# Patient Record
Sex: FEMALE | Race: OTHER | NOT HISPANIC OR LATINO | URBAN - METROPOLITAN AREA
[De-identification: names, ages, dates, MRNs, and addresses within clinical notes are randomized per-mention and may not be internally consistent; named-entity substitution may affect disease eponyms.]

---

## 2021-08-12 ENCOUNTER — OFFICE VISIT (OUTPATIENT)
Dept: OBGYN CLINIC | Facility: CLINIC | Age: 56
End: 2021-08-12
Payer: COMMERCIAL

## 2021-08-12 VITALS
DIASTOLIC BLOOD PRESSURE: 79 MMHG | WEIGHT: 154 LBS | BODY MASS INDEX: 33.22 KG/M2 | HEIGHT: 57 IN | HEART RATE: 67 BPM | SYSTOLIC BLOOD PRESSURE: 115 MMHG

## 2021-08-12 DIAGNOSIS — M22.2X1 PATELLOFEMORAL SYNDROME OF BOTH KNEES: ICD-10-CM

## 2021-08-12 DIAGNOSIS — M22.2X2 PATELLOFEMORAL SYNDROME OF LEFT KNEE: ICD-10-CM

## 2021-08-12 DIAGNOSIS — M22.2X2 PATELLOFEMORAL SYNDROME OF BOTH KNEES: ICD-10-CM

## 2021-08-12 DIAGNOSIS — M17.12 PRIMARY OSTEOARTHRITIS OF LEFT KNEE: Primary | ICD-10-CM

## 2021-08-12 DIAGNOSIS — M62.9 HAMSTRING TIGHTNESS OF BOTH LOWER EXTREMITIES: ICD-10-CM

## 2021-08-12 DIAGNOSIS — R29.898 WEAKNESS OF BOTH HIPS: ICD-10-CM

## 2021-08-12 PROCEDURE — 99204 OFFICE O/P NEW MOD 45 MIN: CPT | Performed by: FAMILY MEDICINE

## 2021-08-12 RX ORDER — MELOXICAM 15 MG/1
15 TABLET ORAL DAILY
Qty: 30 TABLET | Refills: 1 | Status: SHIPPED | OUTPATIENT
Start: 2021-08-12

## 2021-08-23 ENCOUNTER — EVALUATION (OUTPATIENT)
Dept: PHYSICAL THERAPY | Facility: CLINIC | Age: 56
End: 2021-08-23
Payer: COMMERCIAL

## 2021-08-23 DIAGNOSIS — M62.9 HAMSTRING TIGHTNESS OF BOTH LOWER EXTREMITIES: ICD-10-CM

## 2021-08-23 DIAGNOSIS — M22.2X1 PATELLOFEMORAL SYNDROME OF BOTH KNEES: ICD-10-CM

## 2021-08-23 DIAGNOSIS — R29.898 WEAKNESS OF BOTH HIPS: ICD-10-CM

## 2021-08-23 DIAGNOSIS — M22.2X2 PATELLOFEMORAL SYNDROME OF BOTH KNEES: ICD-10-CM

## 2021-08-23 DIAGNOSIS — M17.12 PRIMARY OSTEOARTHRITIS OF LEFT KNEE: ICD-10-CM

## 2021-08-23 PROCEDURE — 97161 PT EVAL LOW COMPLEX 20 MIN: CPT | Performed by: PHYSICAL THERAPIST

## 2021-08-23 PROCEDURE — 97110 THERAPEUTIC EXERCISES: CPT | Performed by: PHYSICAL THERAPIST

## 2021-08-23 NOTE — PROGRESS NOTES
PT Evaluation     Today's date: 2021  Patient name: Layla Lazcano  : 1965  MRN: [id number]  Referring provider: Debbie Langford DO  Dx:   Encounter Diagnosis     ICD-10-CM    1  Primary osteoarthritis of left knee  M17 12 Ambulatory referral to Physical Therapy   2  Patellofemoral syndrome of both knees  M22 2X1 Ambulatory referral to Physical Therapy    M22 2X2    3  Weakness of both hips  R29 898 Ambulatory referral to Physical Therapy   4  Hamstring tightness of both lower extremities  M62 9 Ambulatory referral to Physical Therapy                  Assessment  Assessment details: Layla Lazcano is a 60 y o  female presenting as an outpatient to [place] [id number] PT w/ c/o L knee pain  In addition to pain, pt presents w/ edema, medial patellar tilt, hypertonicity of surrounding musculature, decreased ROM, and decreased strength significantly limiting pt's functional ability  Pt will benefit from skilled PT services to address the above deficits in order to max function to allow pt to achieve goals in PT  Thank you for the referral of this pt  Impairments: abnormal muscle tone, abnormal or restricted ROM, activity intolerance, impaired physical strength, lacks appropriate home exercise program and pain with function  Understanding of Dx/Px/POC: good   Prognosis: good    Goals  ST  Pain decreased by 25% in 4-6 weeks  2  ROM increased by 25% in 4-6 weeks  3  Strength increased by 1/2 to 1 muscle grade in all deficient muscle groups in 4-6 weeks  LT  Decrease pain to 1-2/10 at worst by d/c   2  Increase ROM to [facility] for all deficient movements by d/c   3  Strength increased to 5 for all deficient muscle groups by d/c   4  IADL performance increased to max function by d/c   5  Recreational performance increased to max function by d/c   6  Ambulation/stair climbing improved to max level of function by d/c      Plan  Planned modality interventions: cryotherapy and TENS  Other planned modality interventions: other modalities PRN  Planned therapy interventions: ADL retraining, IADL retraining, flexibility, functional ROM exercises, graded exercise, home exercise program, manual therapy, joint mobilization, neuromuscular re-education, patient education, postural training, strengthening, therapeutic exercise, therapeutic activities and gait training  Other planned therapy interventions: other interventions PRN  Frequency: 1-2x/week  Duration in weeks: 6  Plan of Care beginning date: 2021  Plan of Care expiration date: 10/4/2021  Treatment plan discussed with: patient        Subjective Evaluation    History of Present Illness  Mechanism of injury: Pt reports to IE w/ c/o L knee pain which began in March/2021  She believes onset of pain related to walking in R CAM boot for several months due to R foot fracture  Pain was progressively worsening until she saw ortho who prescribed vitamins/brace which seem to be helping w/ pain   Pt no longer using AD and wearing sneaker regarding R foot fracture  She continues to report intermittent R foot pain  Pt denies any trauma/injury which may have caused initial onset of symptoms  Pt denies any numbness/parasthesias  Pain  Current pain ratin  At worst pain ratin  Location: L knee  Relieving factors: medications and ice (brace; Meloxicam)  Exacerbated by: stair climbing (compensates w/ STS fashion), walking/standing (sometimes immediately and sometimes after few minutes), functional squat, sleeping at night, driving/sitting for extended periods of time w/ knee flexed      Social Support  Steps to enter house: yes (4 MARGARET w/ rail (STS fashion))  Stairs in house: yes (1 FF steps w/ rail to basement (STS fashion))   Lives with: spouse    Employment status: not working (has not returned to work since R foot injury)  Patient Goals  Patient goals for therapy: decreased pain, increased motion and increased strength          Objective Active Range of Motion   Left Knee   Flexion: 124 degrees with pain  Extension: 8 degrees with pain    Passive Range of Motion   Left Knee   Flexion: 130 degrees with pain    Mobility   Patellar Mobility:   Left Knee   WFL: medial, lateral, superior and inferior  Additional Mobility Details  Static patellar positioning: Medial patellar tilt    Strength/Myotome Testing     Left Knee   Flexion: 4+ (*pain)  Extension: 4+  Quadriceps contraction: fair    Additional Strength Details  Hip Strength (L):   Flexion (assessed in supine via SLR):  4-*pain    Abduction: (assessed in s/l): 4*pain  ER (assessed in s/l): 4+ *pain  IR (assessed in s/l): 4 *Pain  Extension (assessed in prone): 4-     Tests     Additional Tests Details  Observation: Pt w/ min edema medial to patella in L vs R knee w/ circumferential girth measurements as below (L/R):  Suprapatellar: 44 cm/45 cm  Midpatellar: 42 cm/42 cm  Infrapatellar:  39 cm/38 cm    Palpation: Hypertonicity/tenderness w/ palpation to medial hamstring, prox gastroc, distal adductors/quad ; tenderness w/ palpation to medial worse vs lateral to knee joint    Knee Special Tests (L):  Lachman: negative  Reverse Lachman: negative  Varus/valgus Stress: negative  Chris: negative  Patellar Tracking: negative     Hamstring Length (assessed via popliteal angle method- L): 22 deg *Pain  PKB (L): 106 deg *Pain        Med[employer] Access Code: [email or web address]        Daily Treatment Diary    EPOC: 10/4/2021  Precautions: Hx of R foot fracture w/in the past year  Co- Morbidities: Hypothyroidism;  Intermittent vertigo      Manual  8/23                     TPR to distal adductors/quad/med HS/prox gastroc (consider IASTM)  NV                                                                                             Total Time                         Exercise Diary 8/23                     THEREX             Pt ed  8' HEP instruct/handout            L knee flexion/patellar PROM             Recumbent Bike                       Gastrocs Stretch- standing                       HR/TR                       Active HS stretch w/ ankle pumps             Prone quad stretch             ITB stretch             Heel Slides                                       NEURO RE-ED             SLR flex/abd/ext                       clamshells             Reverse clamshells             TB resisted 3 way hip strengthening             TB resisted sidestepping             STS                        Lat step ups                       Step ups/downs               Gait Training                                                                       Modalities 8/23                     CP PRN declined

## 2021-08-25 ENCOUNTER — OFFICE VISIT (OUTPATIENT)
Dept: PHYSICAL THERAPY | Facility: CLINIC | Age: 56
End: 2021-08-25
Payer: COMMERCIAL

## 2021-08-25 DIAGNOSIS — R29.898 WEAKNESS OF BOTH HIPS: ICD-10-CM

## 2021-08-25 DIAGNOSIS — M17.12 PRIMARY OSTEOARTHRITIS OF LEFT KNEE: Primary | ICD-10-CM

## 2021-08-25 DIAGNOSIS — M22.2X2 PATELLOFEMORAL SYNDROME OF BOTH KNEES: ICD-10-CM

## 2021-08-25 DIAGNOSIS — M22.2X1 PATELLOFEMORAL SYNDROME OF BOTH KNEES: ICD-10-CM

## 2021-08-25 PROCEDURE — 97112 NEUROMUSCULAR REEDUCATION: CPT

## 2021-08-25 PROCEDURE — 97110 THERAPEUTIC EXERCISES: CPT

## 2021-08-25 NOTE — PROGRESS NOTES
Daily Note     Today's date: 2021  Patient name: Jenna Cates  : 1965  MRN: [id number]  Referring provider: Lionel Messer DO  Dx:   Encounter Diagnosis     ICD-10-CM    1  Primary osteoarthritis of left knee  M17 12    2  Patellofemoral syndrome of both knees  M22 2X1     M22 2X2    3  Weakness of both hips  R29 898        Start Time: 1205          Subjective: Patient reports her knee is starting to feel better  Objective: See treatment diary below      Assessment: Tolerated treatment well  Patient demonstrated fatigue post treatment and would benefit from continued skilled PT  Patient required extensive cueing for proper technique w/ s/l SLR abduction  Patient had a tendency to flex hip due to week glute and hip muscles  Reduced tightness in distal Rectus Femoris post MFR  Plan: Continue per plan of care  Daily Treatment Diary    EPOC: 10/4/2021  Precautions: Hx of R foot fracture w/in the past year  Co- Morbidities: Hypothyroidism;  Intermittent vertigo      Manual                     TPR to distal adductors/quad/med HS/prox gastroc (consider IASTM)  [facility] HECTOR                                                                                           Total Time                         Exercise Diary                    THEREX             Pt ed  8' HEP instruct/handout            L knee flexion/patellar PROM             Recumbent Bike    5'                   Gastrocs Stretch- standing                       HR/TR                       Active HS stretch w/ ankle pumps  10 pumps 3x           Prone quad stretch             ITB stretch             Heel Slides                                       NEURO RE-ED             SLR flex/abd/ext    2x10/15x constant cueing                   clamshells  20x           bridges  20x                                     Reverse clamshells  15x           TB resisted 3 way hip strengthening             TB resisted sidestepping STS                        Lat step ups                       Step ups/downs               Gait Training                                                                       Modalities 8/23                     CP PRN declined

## 2021-08-30 ENCOUNTER — OFFICE VISIT (OUTPATIENT)
Dept: PHYSICAL THERAPY | Facility: CLINIC | Age: 56
End: 2021-08-30
Payer: COMMERCIAL

## 2021-08-30 DIAGNOSIS — M22.2X1 PATELLOFEMORAL SYNDROME OF BOTH KNEES: ICD-10-CM

## 2021-08-30 DIAGNOSIS — R29.898 WEAKNESS OF BOTH HIPS: ICD-10-CM

## 2021-08-30 DIAGNOSIS — M62.9 HAMSTRING TIGHTNESS OF BOTH LOWER EXTREMITIES: ICD-10-CM

## 2021-08-30 DIAGNOSIS — M22.2X2 PATELLOFEMORAL SYNDROME OF BOTH KNEES: ICD-10-CM

## 2021-08-30 DIAGNOSIS — M17.12 PRIMARY OSTEOARTHRITIS OF LEFT KNEE: Primary | ICD-10-CM

## 2021-08-30 PROCEDURE — 97110 THERAPEUTIC EXERCISES: CPT

## 2021-08-30 PROCEDURE — 97112 NEUROMUSCULAR REEDUCATION: CPT

## 2021-08-30 NOTE — PROGRESS NOTES
Daily Note     Today's date: 2021  Patient name: Sola Faye  : 1965  MRN: [id number]  Referring provider: Amanda Sepulveda DO  Dx:   Encounter Diagnosis     ICD-10-CM    1  Primary osteoarthritis of left knee  M17 12    2  Patellofemoral syndrome of both knees  M22 2X1     M22 2X2    3  Weakness of both hips  R29 898    4  Hamstring tightness of both lower extremities  M62 9        Start Time: 1225  Stop Time: 1310  Total time in clinic (min): 45 minutes    Subjective: Patient reports she was a little sore post last session but pain went away within a few hours  Patient stated her knee is feeling better today  Objective: See treatment diary below      Assessment: Tolerated treatment well  Patient demonstrated fatigue post treatment and would benefit from continued skilled PT  This session we progressed to standing hip strengthening w/ fatigue reported in glutes  Reduced muscular tightness in VMO noted  Plan: Continue per plan of care  Daily Treatment Diary    EPOC: 10/4/2021  Precautions: Hx of R foot fracture w/in the past year  Co- Morbidities: Hypothyroidism;  Intermittent vertigo      Manual                   TPR to distal adductors/quad/med HS/prox gastroc (consider IASTM)  NV  [facility] HECTOR  [facility] HECTOR                                                                                         Total Time      5'                   Exercise Diary                  THEREX             Pt ed  8' HEP instruct/handout            L knee flexion/patellar PROM             Recumbent Bike    5'  10'                 Gastrocs Stretch- standing                       HR/TR                       Active HS stretch w/ ankle pumps  10 pumps 3x 10 pumps 3x          Prone quad stretch             ITB stretch             Heel Slides                                       NEURO RE-ED             SLR flex/abd/ext    2x10/15x constant cueing  2x10                 clamshells  20x 20x bridges  20x 20x                                    Reverse clamshells  15x 20x          TB resisted 3 way hip strengthening   2x10          TB resisted sidestepping             STS                        Lat step ups                       Step ups/downs    6" 20x           Gait Training                                                                       Modalities 8/23                     CP PRN declined

## 2021-09-01 ENCOUNTER — OFFICE VISIT (OUTPATIENT)
Dept: PHYSICAL THERAPY | Facility: CLINIC | Age: 56
End: 2021-09-01
Payer: COMMERCIAL

## 2021-09-01 DIAGNOSIS — M22.2X1 PATELLOFEMORAL SYNDROME OF BOTH KNEES: ICD-10-CM

## 2021-09-01 DIAGNOSIS — M22.2X2 PATELLOFEMORAL SYNDROME OF BOTH KNEES: ICD-10-CM

## 2021-09-01 DIAGNOSIS — M17.12 PRIMARY OSTEOARTHRITIS OF LEFT KNEE: Primary | ICD-10-CM

## 2021-09-01 PROCEDURE — 97112 NEUROMUSCULAR REEDUCATION: CPT

## 2021-09-01 PROCEDURE — 97110 THERAPEUTIC EXERCISES: CPT

## 2021-09-01 NOTE — PROGRESS NOTES
Daily Note     Today's date: 2021  Patient name: Curtis Alexander  : 1965  MRN: [id number]  Referring provider: Justine Nuñez DO  Dx:   Encounter Diagnosis     ICD-10-CM    1  Primary osteoarthritis of left knee  M17 12    2  Patellofemoral syndrome of both knees  M22 2X1     M22 2X2        Start Time: 0603  Stop Time: 1625  Total time in clinic (min): 44 minutes    Subjective: Patient reports her knee is doing better, able to do more with less pain  Objective: See treatment diary below      Assessment: Tolerated treatment well  Patient demonstrated fatigue post treatment and would benefit from continued skilled PT  This session we progressed functional strengthening w/o difficulty  Patient did c/o tightness in medial knee, reduced post MFR  Plan: Continue per plan of care  Daily Treatment Diary    EPOC: 10/4/2021  Precautions: Hx of R foot fracture w/in the past year  Co- Morbidities: Hypothyroidism;  Intermittent vertigo      Manual                 TPR to distal adductors/quad/med HS/prox gastroc (consider IASTM)  [place]  [facility]                                                                                       Total Time      5'  5'                 Exercise Diary                THEREX             Pt ed  8' HEP instruct/handout            L knee flexion/patellar PROM             Recumbent Bike    5'  10'  10'               Gastrocs Stretch- standing       Off step 30" 4x                HR/TR                       Active HS stretch w/ ankle pumps  10 pumps 3x 10 pumps 3x          Prone quad stretch             ITB stretch             Heel Slides                                       NEURO RE-ED             SLR flex/abd/ext    2x10/15x constant cueing  2x10                 clamshells  20x 20x          bridges  20x 20x                                    Reverse clamshells  15x 20x          TB resisted 3 way hip strengthening   2x10 2x10 TB resisted sidestepping    YTB 3 laps at bar         STS        2x10                Lat step ups        6" 20x               Step ups/downs    6" 20x 6" 20x          Gait Training                                                                       Modalities 8/23                     CP PRN declined

## 2021-09-13 ENCOUNTER — OFFICE VISIT (OUTPATIENT)
Dept: PHYSICAL THERAPY | Facility: CLINIC | Age: 56
End: 2021-09-13
Payer: COMMERCIAL

## 2021-09-13 DIAGNOSIS — M22.2X2 PATELLOFEMORAL SYNDROME OF BOTH KNEES: ICD-10-CM

## 2021-09-13 DIAGNOSIS — M17.12 PRIMARY OSTEOARTHRITIS OF LEFT KNEE: Primary | ICD-10-CM

## 2021-09-13 DIAGNOSIS — M22.2X1 PATELLOFEMORAL SYNDROME OF BOTH KNEES: ICD-10-CM

## 2021-09-13 PROCEDURE — 97112 NEUROMUSCULAR REEDUCATION: CPT

## 2021-09-13 PROCEDURE — 97110 THERAPEUTIC EXERCISES: CPT

## 2021-09-13 NOTE — PROGRESS NOTES
Daily Note     Today's date: 2021  Patient name: Christin Rader  : 1965  MRN: [id number]  Referring provider: Niraj Lopez DO  Dx:   Encounter Diagnosis     ICD-10-CM    1  Primary osteoarthritis of left knee  M17 12    2  Patellofemoral syndrome of both knees  M22 2X1     M22 2X2        Start Time: 9955  Stop Time: 1540  Total time in clinic (min): 45 minutes    Subjective: Patient reports she thinks her knee is getting better  Objective: See treatment diary below      Assessment: Tolerated treatment well  Patient demonstrated fatigue post treatment and would benefit from continued skilled PT  This session we progressed stability exercises  Provided patient w/ HEP  Plan: Continue per plan of care  Daily Treatment Diary    EPOC: 10/4/2021  Precautions: Hx of R foot fracture w/in the past year  Co- Morbidities: Hypothyroidism;  Intermittent vertigo      Manual                 TPR to distal adductors/quad/med HS/prox gastroc (consider IASTM)  [place]  [facility]                                                                                       Total Time      5'  5'                 Exercise Diary              THEREX             Pt ed  8' HEP instruct/handout            L knee flexion/patellar PROM             Recumbent Bike    5'  10'  10'  10'             Gastrocs Stretch- standing       Off step 30" 4x   Off step 30" 4x             HR/TR                       Active HS stretch w/ ankle pumps  10 pumps 3x 10 pumps 3x          Prone quad stretch             ITB stretch             Heel Slides                                       NEURO RE-ED             SLR flex/abd/ext    2x10/15x constant cueing  2x10                 clamshells  20x 20x          bridges  20x 20x          SLS airex     30" 3x ea                     Reverse clamshells  15x 20x          TB resisted 3 way hip strengthening   2x10 2x10 2x10 YTB        TB resisted sidestepping    YTB 3 laps at bar YTB 3 laps at bar + mud walks 3 laps        STS        2x10 2x10              Lat step ups        6" 20x  6" 20x             Step ups/downs    6" 20x 6" 20x 6" 20x         Gait Training                                                                       Modalities 8/23                     CP PRN declined

## 2021-09-15 ENCOUNTER — OFFICE VISIT (OUTPATIENT)
Dept: PHYSICAL THERAPY | Facility: CLINIC | Age: 56
End: 2021-09-15
Payer: COMMERCIAL

## 2021-09-15 DIAGNOSIS — M17.12 PRIMARY OSTEOARTHRITIS OF LEFT KNEE: Primary | ICD-10-CM

## 2021-09-15 DIAGNOSIS — M22.2X2 PATELLOFEMORAL SYNDROME OF BOTH KNEES: ICD-10-CM

## 2021-09-15 DIAGNOSIS — M22.2X1 PATELLOFEMORAL SYNDROME OF BOTH KNEES: ICD-10-CM

## 2021-09-15 PROCEDURE — 97530 THERAPEUTIC ACTIVITIES: CPT

## 2021-09-15 PROCEDURE — 97112 NEUROMUSCULAR REEDUCATION: CPT

## 2021-09-15 NOTE — PROGRESS NOTES
Daily Note     Today's date: 9/15/2021  Patient name: Brayan Cochran  : 1965  MRN: [id number]  Referring provider: Aubrie Salazar DO  Dx:   Encounter Diagnosis     ICD-10-CM    1  Primary osteoarthritis of left knee  M17 12    2  Patellofemoral syndrome of both knees  M22 2X1     M22 2X2        Start Time: 1218  Stop Time: 1310  Total time in clinic (min): 52 minutes    Subjective: Patient reports she has difficulty descending stairs compared to ascending stairs  Objective: See treatment diary below      Assessment: Tolerated treatment well  Patient demonstrated fatigue post treatment and would benefit from continued skilled PT  We added fwd/lat step downs this session  Patient c/o knee pain w/ step downs, tolerated better w/ 2" step  Plan: Continue per plan of care  Daily Treatment Diary    EPOC: 10/4/2021  Precautions: Hx of R foot fracture w/in the past year  Co- Morbidities: Hypothyroidism;  Intermittent vertigo      Manual                 TPR to distal adductors/quad/med HS/prox gastroc (consider IASTM)  [place]  [facility]                                                                                       Total Time      5'  5'                 Exercise Diary 8/23  8/25  8/30  9/1  9/13  9/15           THEREX             Pt ed  8' HEP instruct/handout            L knee flexion/patellar PROM             Recumbent Bike    5'  10'  10'  10'  10'           Gastrocs Stretch- standing       Off step 30" 4x   Off step 30" 4x  Off step 30" 4x           bosu lunges           2x10           Active HS stretch w/ ankle pumps  10 pumps 3x 10 pumps 3x          Prone quad stretch             ITB stretch             Heel Slides                                       NEURO RE-ED             SLR flex/abd/ext    2x10/15x constant cueing  2x10                 clamshells  20x 20x          bridges  20x 20x          SLS airex     30" 3x ea 30" 3x ea                    Reverse clamshells 15x 20x          TB resisted 3 way hip strengthening   2x10 2x10 2x10 YTB YTB 2x10 ea       TB resisted sidestepping    YTB 3 laps at bar YTB 3 laps at bar + mud walks 3 laps YTB 3 laps + mud walks 3 laps at bar       STS        2x10 2x10  2x10            Lat step ups        6" 20x  6" 20x  6" 20x           Step ups/downs    6" 20x 6" 20x 6" 20x 6" 20x        Therapeutic activity                       Lateral dips           4" 2x10           Step downs           2" 2x10             Modalities 8/23                     CP PRN declined

## 2021-09-20 ENCOUNTER — OFFICE VISIT (OUTPATIENT)
Dept: PHYSICAL THERAPY | Facility: CLINIC | Age: 56
End: 2021-09-20
Payer: COMMERCIAL

## 2021-09-20 DIAGNOSIS — M17.12 PRIMARY OSTEOARTHRITIS OF LEFT KNEE: Primary | ICD-10-CM

## 2021-09-20 DIAGNOSIS — M22.2X2 PATELLOFEMORAL SYNDROME OF BOTH KNEES: ICD-10-CM

## 2021-09-20 DIAGNOSIS — M22.2X1 PATELLOFEMORAL SYNDROME OF BOTH KNEES: ICD-10-CM

## 2021-09-20 PROCEDURE — 97110 THERAPEUTIC EXERCISES: CPT

## 2021-09-20 PROCEDURE — 97112 NEUROMUSCULAR REEDUCATION: CPT

## 2021-09-20 NOTE — PROGRESS NOTES
Daily Note     Today's date: 2021  Patient name: Yoana Marte  : 1965  MRN: [id number]  Referring provider: Joan Ocasio DO  Dx:   Encounter Diagnosis     ICD-10-CM    1  Primary osteoarthritis of left knee  M17 12    2  Patellofemoral syndrome of both knees  M22 2X1     M22 2X2        Start Time: 0087  Stop Time: 1315  Total time in clinic (min): 47 minutes    Subjective: Patient reports she was sore this weekend secondary to trying to jump  Objective: See treatment diary below      Assessment: Tolerated treatment well  Patient demonstrated fatigue post treatment and would benefit from continued skilled PT  Patient was challenged w/ increased height w/ fwd step ups  Patient c/o fatigue and discomfort in R knee w/ step ups  Plan: Continue per plan of care  Daily Treatment Diary    EPOC: 10/4/2021  Precautions: Hx of R foot fracture w/in the past year  Co- Morbidities: Hypothyroidism;  Intermittent vertigo      Manual                 TPR to distal adductors/quad/med HS/prox gastroc (consider IASTM)  [place]  [facility]                                                                                       Total Time      5'  5'                 Exercise Diary 8/23  8/25  8/30  9/1  9/13  9/15  9/20         THEREX             Pt ed  8' HEP instruct/handout            L knee flexion/patellar PROM             Recumbent Bike    5'  10'  10'  10'  10'  10  '         Gastrocs Stretch- standing       Off step 30" 4x   Off step 30" 4x  Off step 30" 4x  30" 4x         bosu lunges           2x10           Active HS stretch w/ ankle pumps  10 pumps 3x 10 pumps 3x          Prone quad stretch             ITB stretch             Heel Slides                                       NEURO RE-ED             SLR flex/abd/ext    2x10/15x constant cueing  2x10                 clamshells  20x 20x          bridges  20x 20x          SLS airex     30" 3x ea 30" 3x ea Reverse clamshells  15x 20x          TB resisted 3 way hip strengthening   2x10 2x10 2x10 YTB YTB 2x10 ea YTB 20x      TB resisted sidestepping    YTB 3 laps at bar YTB 3 laps at bar + mud walks 3 laps YTB 3 laps + mud walks 3 laps at bar 3 laps      STS        2x10 2x10  2x10            Lat step ups        6" 20x  6" 20x  6" 20x  6" 20x         Step ups/downs    6" 20x 6" 20x 6" 20x 6" 20x 8" 20x       Therapeutic activity                       Lateral dips           4" 2x10           Step downs           2" 2x10  4" 20x           Modalities 8/23                     CP PRN declined

## 2021-09-22 ENCOUNTER — EVALUATION (OUTPATIENT)
Dept: PHYSICAL THERAPY | Facility: CLINIC | Age: 56
End: 2021-09-22
Payer: COMMERCIAL

## 2021-09-22 DIAGNOSIS — R29.898 WEAKNESS OF BOTH HIPS: ICD-10-CM

## 2021-09-22 DIAGNOSIS — M22.2X1 PATELLOFEMORAL SYNDROME OF BOTH KNEES: ICD-10-CM

## 2021-09-22 DIAGNOSIS — M22.2X2 PATELLOFEMORAL SYNDROME OF BOTH KNEES: ICD-10-CM

## 2021-09-22 DIAGNOSIS — M17.12 PRIMARY OSTEOARTHRITIS OF LEFT KNEE: Primary | ICD-10-CM

## 2021-09-22 DIAGNOSIS — M62.9 HAMSTRING TIGHTNESS OF BOTH LOWER EXTREMITIES: ICD-10-CM

## 2021-09-22 PROCEDURE — 97110 THERAPEUTIC EXERCISES: CPT | Performed by: PHYSICAL THERAPIST

## 2021-09-22 PROCEDURE — 97112 NEUROMUSCULAR REEDUCATION: CPT | Performed by: PHYSICAL THERAPIST

## 2021-09-22 NOTE — PROGRESS NOTES
PT Re-Evaluation     Today's date: 2021  Patient name: Jessica Jung  : 1965  MRN: [id number]  Referring provider: Hellen Grant DO  Dx:   Encounter Diagnosis     ICD-10-CM    1  Primary osteoarthritis of left knee  M17 12    2  Patellofemoral syndrome of both knees  M22 2X1     M22 2X2    3  Weakness of both hips  R29 898    4  Hamstring tightness of both lower extremities  M62 9                   Assessment  Assessment details: Jessica Jung is a 60 y o  female being treated as an outpatient at [facility] [place] [facility] PT w/ initial c/o L knee pain  Since IE, pt has made strong and steady gains in pain reduction, ROM, and strength, but continues to remain limited from max function  Pt will continue to benefit from skilled PT services in order to max function to allow pt to achieve goals in PT  Thank you  Impairments: abnormal muscle tone, abnormal or restricted ROM, activity intolerance, impaired physical strength and pain with function  Understanding of Dx/Px/POC: good   Prognosis: good    Goals  ST  Pain decreased by 25% in 4-6 weeks  - Met  2  ROM increased by 25% in 4-6 weeks  -Partially Met  3  Strength increased by 1/2 to 1 muscle grade in all deficient muscle groups in 4-6 weeks  -Partially met    LT  Decrease pain to 1-2/10 at worst by d/c - Not Met  2  Increase ROM to [facility] for all deficient movements by d/c - Partially met  3  Strength increased to 5 for all deficient muscle groups by d/c - Partially Met  4  IADL performance increased to max function by d/c - Partially Met  5  Recreational performance increased to max function by d/c - PartiallY Met  6   Ambulation/stair climbing improved to max level of function by d/c - Partially Met    Plan  Planned modality interventions: cryotherapy  Other planned modality interventions: other modalities PRN  Planned therapy interventions: ADL retraining, IADL retraining, flexibility, functional ROM exercises, graded exercise, home exercise program, manual therapy, joint mobilization, neuromuscular re-education, patient education, postural training, strengthening, therapeutic exercise, therapeutic activities and gait training  Other planned therapy interventions: other interventions PRN  Frequency: 1-2x/week  Duration in weeks: 4  Plan of Care beginning date: 2021  Plan of Care expiration date: 10/20/2021  Treatment plan discussed with: patient        Subjective Evaluation    History of Present Illness  Mechanism of injury: CURRENT LEVEL (2021)  Pt reports steady progress since IE  Pt feels 60% recovered at this time  Although still difficult, she reports improved pain level and ability to negotiate stairs (mostly in reciprocating fashion at this time), w/ extended periods of walking/standing, w/ functional squat, w/ sleeping at night, and w/ driving/sitting for extended periods of time w/ knee flexed  INITIAL LEVEL (2021)  Pt reports to IE w/ c/o L knee pain which began in March/2021  She believes onset of pain related to walking in [facility] [employer] for several months due to R foot fracture  Pain was progressively worsening until she saw ortho who prescribed vitamins/brace which seem to be helping w/ pain   Pt no longer using AD and wearing sneaker regarding R foot fracture  She continues to report intermittent R foot pain  Pt denies any trauma/injury which may have caused initial onset of symptoms  Pt denies any numbness/parasthesias  Pain  Current pain ratin  At worst pain ratin  Location: L knee  Relieving factors: medications and ice (brace (only when walking 20-30 minutes); Meloxicam)  Exacerbated by: stair climbing (can now alternate b/w reciprocating and STS fashion), walking/standing > 20-30 minutes, functional squat, sleeping at night (recommended pillow b/w knees), driving/sitting for extended periods of time      Social Support  Steps to enter house: yes (4 MARGARET w/ rail (STS fashion))  Stairs in house: yes (1 FF steps w/ rail to basement (STS fashion))   Lives with: spouse    Employment status: not working (has not returned to work since R foot injury)  Patient Goals  Patient goals for therapy: decreased pain, increased motion and increased strength (Partially Met)          Objective     Active Range of Motion   Left Knee   Flexion: 135 degrees with pain  Extension: 7 degrees     Passive Range of Motion   Left Knee   Flexion: 137 degrees with pain    Mobility   Patellar Mobility:   Left Knee   WFL: medial, lateral, superior and inferior  Additional Mobility Details  Static patellar positioning: Medial patellar tilt    Strength/Myotome Testing     Left Knee   Flexion: 4+  Extension: 5  Quadriceps contraction: good    Additional Strength Details  Hip Strength (L):   Flexion (assessed in supine via SLR):  5  Abduction: (assessed in s/l): 4+  ER (assessed in s/l): 5  IR (assessed in s/l): 4+ *min pain  Extension (assessed in prone): 4    Tests     Additional Tests Details  Observation: Pt w/ min edema medial to patella in L vs R knee w/ circumferential girth measurements as below (L/R):  Suprapatellar: 44 cm/45 cm  Midpatellar: 42 5 cm/42 cm  Infrapatellar:  38 5 cm/38 cm    Palpation: Hypertonicity/tenderness w/ palpation to medial hamstring, prox gastroc, distal adductors/quad ; tenderness w/ palpation to medial worse vs lateral to knee joint    Knee Special Tests (L):  Lachman: negative  Reverse Lachman: negative  Varus/valgus Stress: negative  Chris: negative  Patellar Tracking: negative     Hamstring Length (assessed via popliteal angle method- L): 22 deg  PKB (L): 106 deg *Pain        Daily Treatment Diary    EPOC: 10/20/2021  Precautions: Hx of R foot fracture w/in the past year  Co- Morbidities: Hypothyroidism;  Intermittent vertigo      Manual  8/23 8/25 8/30 9/1 9/20             TPR to distal adductors/quad/med HS/prox gastroc (consider IASTM)  [facility]  [facility]  RB Total Time      5'  5'  5'               Exercise Diary 8/23  8/25  8/30  9/1  9/13  9/15  9/20  9/22      THEREX              Pt ed  8' HEP instruct/handout             Progress Note        10'      L knee flexion/patellar PROM        2'      Recumbent Bike    5'  10'  10'  10'  10'  10  '  10'      Gastrocs Stretch- standing       Off step 30" 4x   Off step 30" 4x  Off step 30" 4x  30" 4x 30"x4       bosu lunges           2x10          Active HS stretch w/ ankle pumps  10 pumps 3x 10 pumps 3x     10 pumps x 3      Prone quad stretch              ITB stretch              Heel Slides                                          NEURO RE-ED              SLR flex/abd/ext    2x10/15x constant cueing  2x10                clamshells  20x 20x           bridges  20x 20x           SLS airex     30" 3x ea 30" 3x ea                      Reverse clamshells  15x 20x           TB resisted 3 way hip strengthening   2x10 2x10 2x10 YTB YTB 2x10 ea YTB 20x YTB 20x ea dir b/l       TB resisted sidestepping    YTB 3 laps at bar YTB 3 laps at bar + mud walks 3 laps YTB 3 laps + mud walks 3 laps at bar 3 laps 3 laps      STS        2x10 2x10  2x10    w/ weight nV       Lat step ups        6" 20x  6" 20x  6" 20x  6" 20x        Step ups/downs    6" 20x 6" 20x 6" 20x 6" 20x 8" 20x        Therapeutic activity                      Lateral dips           4" 2x10          Step downs           2" 2x10  4" 20x          Modalities 8/23                     CP PRN declined

## 2021-09-23 ENCOUNTER — OFFICE VISIT (OUTPATIENT)
Dept: OBGYN CLINIC | Facility: CLINIC | Age: 56
End: 2021-09-23
Payer: COMMERCIAL

## 2021-09-23 ENCOUNTER — APPOINTMENT (OUTPATIENT)
Dept: RADIOLOGY | Facility: CLINIC | Age: 56
End: 2021-09-23
Payer: COMMERCIAL

## 2021-09-23 VITALS
DIASTOLIC BLOOD PRESSURE: 85 MMHG | BODY MASS INDEX: 33.01 KG/M2 | HEIGHT: 57 IN | HEART RATE: 57 BPM | SYSTOLIC BLOOD PRESSURE: 130 MMHG | WEIGHT: 153 LBS

## 2021-09-23 DIAGNOSIS — M22.2X2 PATELLOFEMORAL SYNDROME OF LEFT KNEE: ICD-10-CM

## 2021-09-23 DIAGNOSIS — M17.12 PRIMARY OSTEOARTHRITIS OF LEFT KNEE: Primary | ICD-10-CM

## 2021-09-23 DIAGNOSIS — M17.12 PRIMARY OSTEOARTHRITIS OF LEFT KNEE: ICD-10-CM

## 2021-09-23 PROCEDURE — 73562 X-RAY EXAM OF KNEE 3: CPT

## 2021-09-23 PROCEDURE — 99214 OFFICE O/P EST MOD 30 MIN: CPT | Performed by: FAMILY MEDICINE

## 2021-09-23 NOTE — PROGRESS NOTES
Assessment/Plan:  Assessment/Plan   Diagnoses and all orders for this visit:    Primary osteoarthritis of left knee  -     XR knee 3 vw left non injury; Future    Patellofemoral syndrome of left knee        61-year-old female with osteoarthritis of left knee with left knee pain of more than 2 months duration  Discussed with patient physical exam, radiographs, impression and plan  X-rays left knee noted for tricompartmental degenerative changes  Physical exam of left knee is noted for mild tenderness at the medial joint line  She has full extension and flexion to 120°  There is mild laxity with valgus stress  Clinical impression is that she is symptomatic from combination of degenerative changes of the knee and from abnormal patellofemoral mechanics  I recommend she complete her course of formal physical therapy and afterward continue with home exercise program   Patient was given option of injection today which she declines at this time  She prefers to complete her course of PT and then reassess her pain level at that time  She will follow up with me as needed in this regard  Subjective:   Patient ID: Hiram Bah is a 60 y o  female  Chief Complaint   Patient presents with    Left Knee - Follow-up       61-year-old female with osteoarthritis of left knee following up for left knee pain of more than 2 months duration  She was last seen by me 6 weeks ago which point she was prescribed meloxicam 15 mg once daily  She was provided with patellar stabilizing knee brace, advised on taking supplements, and referred to physical therapy  She has been attending physical therapy and doing home exercises since 08/23/2021  She reports feeling better since her last visit, but still having some symptoms that are bothersome    She has pain described as localized mainly to the medial aspect knee, pinching and sometimes sharp, nonradiating, worse with prolonged standing and ambulating, and improved with resting  She also reports that she is able to stand and ambulate for much longer durations while wearing the brace compared to when she does not wear the brace  Knee Pain  This is a new problem  The current episode started more than 1 month ago  The problem occurs daily  The problem has been waxing and waning  Associated symptoms include arthralgias  Pertinent negatives include no joint swelling, numbness or weakness  The symptoms are aggravated by standing and walking  She has tried rest, NSAIDs and position changes (Physical therapy, home exercise, supplements, knee brace) for the symptoms  The treatment provided mild relief  Review of Systems   Musculoskeletal: Positive for arthralgias  Negative for joint swelling  Neurological: Negative for weakness and numbness  Objective:  Vitals:    09/23/21 0908   BP: 130/85   Pulse: 57   Weight: 69 4 kg (153 lb)   Height: 4' 9" (1 448 m)     Left Knee Exam     Muscle Strength   The patient has normal left knee strength  Tenderness   The patient is experiencing tenderness in the medial joint line  Range of Motion   Extension: normal   Flexion: 120     Tests   Varus: negative Valgus: positive            Physical Exam  Vitals and nursing note reviewed  Constitutional:       General: She is not in acute distress  Appearance: She is well-developed  She is not ill-appearing or diaphoretic  HENT:      Head: Normocephalic  Right Ear: External ear normal       Left Ear: External ear normal    Eyes:      Conjunctiva/sclera: Conjunctivae normal    Neck:      Trachea: No tracheal deviation  Cardiovascular:      Rate and Rhythm: Normal rate  Pulmonary:      Effort: Pulmonary effort is normal  No respiratory distress  Abdominal:      General: There is no distension  Musculoskeletal:         General: Tenderness present  No swelling, deformity or signs of injury  Skin:     General: Skin is warm and dry        Coloration: Skin is not jaundiced or pale  Neurological:      Mental Status: She is alert and oriented to person, place, and time  Psychiatric:         Mood and Affect: Mood normal          Behavior: Behavior normal          Thought Content: Thought content normal          Judgment: Judgment normal          I have personally reviewed pertinent films in PACS and my interpretation is Tricompartmental degenerative changes

## 2021-09-27 ENCOUNTER — APPOINTMENT (OUTPATIENT)
Dept: PHYSICAL THERAPY | Facility: CLINIC | Age: 56
End: 2021-09-27
Payer: COMMERCIAL

## 2021-09-29 ENCOUNTER — APPOINTMENT (OUTPATIENT)
Dept: PHYSICAL THERAPY | Facility: CLINIC | Age: 56
End: 2021-09-29
Payer: COMMERCIAL

## 2021-10-04 ENCOUNTER — APPOINTMENT (OUTPATIENT)
Dept: PHYSICAL THERAPY | Facility: CLINIC | Age: 56
End: 2021-10-04
Payer: COMMERCIAL

## 2021-10-06 ENCOUNTER — APPOINTMENT (OUTPATIENT)
Dept: PHYSICAL THERAPY | Facility: CLINIC | Age: 56
End: 2021-10-06
Payer: COMMERCIAL

## 2021-10-11 ENCOUNTER — OFFICE VISIT (OUTPATIENT)
Dept: PHYSICAL THERAPY | Facility: CLINIC | Age: 56
End: 2021-10-11
Payer: COMMERCIAL

## 2021-10-11 DIAGNOSIS — M17.12 PRIMARY OSTEOARTHRITIS OF LEFT KNEE: Primary | ICD-10-CM

## 2021-10-11 DIAGNOSIS — M22.2X2 PATELLOFEMORAL SYNDROME OF BOTH KNEES: ICD-10-CM

## 2021-10-11 DIAGNOSIS — R29.898 WEAKNESS OF BOTH HIPS: ICD-10-CM

## 2021-10-11 DIAGNOSIS — M22.2X1 PATELLOFEMORAL SYNDROME OF BOTH KNEES: ICD-10-CM

## 2021-10-11 DIAGNOSIS — M62.9 HAMSTRING TIGHTNESS OF BOTH LOWER EXTREMITIES: ICD-10-CM

## 2021-10-11 PROCEDURE — 97112 NEUROMUSCULAR REEDUCATION: CPT

## 2021-10-11 PROCEDURE — 97110 THERAPEUTIC EXERCISES: CPT

## 2021-10-12 ENCOUNTER — OFFICE VISIT (OUTPATIENT)
Dept: OBGYN CLINIC | Facility: CLINIC | Age: 56
End: 2021-10-12
Payer: COMMERCIAL

## 2021-10-12 ENCOUNTER — APPOINTMENT (OUTPATIENT)
Dept: RADIOLOGY | Facility: CLINIC | Age: 56
End: 2021-10-12
Payer: COMMERCIAL

## 2021-10-12 VITALS
HEIGHT: 57 IN | WEIGHT: 154.2 LBS | DIASTOLIC BLOOD PRESSURE: 86 MMHG | HEART RATE: 35 BPM | SYSTOLIC BLOOD PRESSURE: 133 MMHG | BODY MASS INDEX: 33.27 KG/M2

## 2021-10-12 DIAGNOSIS — S99.921A INJURY OF RIGHT FOOT, INITIAL ENCOUNTER: ICD-10-CM

## 2021-10-12 DIAGNOSIS — M19.071 ARTHRITIS OF RIGHT FOOT: Primary | ICD-10-CM

## 2021-10-12 DIAGNOSIS — M21.41 PES PLANUS OF BOTH FEET: ICD-10-CM

## 2021-10-12 DIAGNOSIS — M21.42 PES PLANUS OF BOTH FEET: ICD-10-CM

## 2021-10-12 PROCEDURE — 73630 X-RAY EXAM OF FOOT: CPT

## 2021-10-12 PROCEDURE — 99214 OFFICE O/P EST MOD 30 MIN: CPT | Performed by: FAMILY MEDICINE

## 2021-10-12 RX ORDER — DICLOFENAC SODIUM 75 MG/1
75 TABLET, DELAYED RELEASE ORAL 2 TIMES DAILY
Qty: 60 TABLET | Refills: 0 | Status: SHIPPED | OUTPATIENT
Start: 2021-10-12

## 2021-10-12 RX ORDER — LEVOTHYROXINE SODIUM 112 UG/1
112 TABLET ORAL DAILY
COMMUNITY
Start: 2021-09-20

## 2021-10-13 ENCOUNTER — OFFICE VISIT (OUTPATIENT)
Dept: PHYSICAL THERAPY | Facility: CLINIC | Age: 56
End: 2021-10-13
Payer: COMMERCIAL

## 2021-10-13 DIAGNOSIS — M22.2X1 PATELLOFEMORAL SYNDROME OF BOTH KNEES: ICD-10-CM

## 2021-10-13 DIAGNOSIS — M22.2X2 PATELLOFEMORAL SYNDROME OF BOTH KNEES: ICD-10-CM

## 2021-10-13 DIAGNOSIS — M17.12 PRIMARY OSTEOARTHRITIS OF LEFT KNEE: Primary | ICD-10-CM

## 2021-10-13 PROCEDURE — 97110 THERAPEUTIC EXERCISES: CPT

## 2021-10-13 PROCEDURE — 97112 NEUROMUSCULAR REEDUCATION: CPT

## 2021-10-18 ENCOUNTER — OFFICE VISIT (OUTPATIENT)
Dept: PHYSICAL THERAPY | Facility: CLINIC | Age: 56
End: 2021-10-18
Payer: COMMERCIAL

## 2021-10-18 ENCOUNTER — TELEPHONE (OUTPATIENT)
Dept: OBGYN CLINIC | Facility: HOSPITAL | Age: 56
End: 2021-10-18

## 2021-10-18 DIAGNOSIS — M22.2X1 PATELLOFEMORAL SYNDROME OF BOTH KNEES: ICD-10-CM

## 2021-10-18 DIAGNOSIS — R29.898 WEAKNESS OF BOTH HIPS: ICD-10-CM

## 2021-10-18 DIAGNOSIS — M22.2X2 PATELLOFEMORAL SYNDROME OF BOTH KNEES: ICD-10-CM

## 2021-10-18 DIAGNOSIS — M62.9 HAMSTRING TIGHTNESS OF BOTH LOWER EXTREMITIES: ICD-10-CM

## 2021-10-18 DIAGNOSIS — M17.12 PRIMARY OSTEOARTHRITIS OF LEFT KNEE: Primary | ICD-10-CM

## 2021-10-18 PROCEDURE — 97110 THERAPEUTIC EXERCISES: CPT

## 2021-10-18 PROCEDURE — 97112 NEUROMUSCULAR REEDUCATION: CPT

## 2021-10-20 ENCOUNTER — OFFICE VISIT (OUTPATIENT)
Dept: PHYSICAL THERAPY | Facility: CLINIC | Age: 56
End: 2021-10-20
Payer: COMMERCIAL

## 2021-10-20 DIAGNOSIS — M17.12 PRIMARY OSTEOARTHRITIS OF LEFT KNEE: Primary | ICD-10-CM

## 2021-10-20 DIAGNOSIS — M22.2X2 PATELLOFEMORAL SYNDROME OF BOTH KNEES: ICD-10-CM

## 2021-10-20 DIAGNOSIS — M22.2X1 PATELLOFEMORAL SYNDROME OF BOTH KNEES: ICD-10-CM

## 2021-10-20 PROCEDURE — 97110 THERAPEUTIC EXERCISES: CPT

## 2021-10-20 PROCEDURE — 97112 NEUROMUSCULAR REEDUCATION: CPT

## 2021-10-25 ENCOUNTER — OFFICE VISIT (OUTPATIENT)
Dept: PHYSICAL THERAPY | Facility: CLINIC | Age: 56
End: 2021-10-25
Payer: COMMERCIAL

## 2021-10-25 DIAGNOSIS — M62.9 HAMSTRING TIGHTNESS OF BOTH LOWER EXTREMITIES: ICD-10-CM

## 2021-10-25 DIAGNOSIS — M17.12 PRIMARY OSTEOARTHRITIS OF LEFT KNEE: Primary | ICD-10-CM

## 2021-10-25 DIAGNOSIS — M22.2X2 PATELLOFEMORAL SYNDROME OF BOTH KNEES: ICD-10-CM

## 2021-10-25 DIAGNOSIS — M22.2X1 PATELLOFEMORAL SYNDROME OF BOTH KNEES: ICD-10-CM

## 2021-10-25 DIAGNOSIS — R29.898 WEAKNESS OF BOTH HIPS: ICD-10-CM

## 2021-10-25 PROCEDURE — 97110 THERAPEUTIC EXERCISES: CPT

## 2021-10-25 PROCEDURE — 97112 NEUROMUSCULAR REEDUCATION: CPT

## 2021-10-27 ENCOUNTER — OFFICE VISIT (OUTPATIENT)
Dept: PHYSICAL THERAPY | Facility: CLINIC | Age: 56
End: 2021-10-27
Payer: COMMERCIAL

## 2021-10-27 DIAGNOSIS — M22.2X2 PATELLOFEMORAL SYNDROME OF BOTH KNEES: ICD-10-CM

## 2021-10-27 DIAGNOSIS — M22.2X1 PATELLOFEMORAL SYNDROME OF BOTH KNEES: ICD-10-CM

## 2021-10-27 DIAGNOSIS — M62.9 HAMSTRING TIGHTNESS OF BOTH LOWER EXTREMITIES: ICD-10-CM

## 2021-10-27 DIAGNOSIS — M17.12 PRIMARY OSTEOARTHRITIS OF LEFT KNEE: Primary | ICD-10-CM

## 2021-10-27 DIAGNOSIS — R29.898 WEAKNESS OF BOTH HIPS: ICD-10-CM

## 2021-10-27 PROCEDURE — 97110 THERAPEUTIC EXERCISES: CPT | Performed by: PHYSICAL THERAPIST

## 2021-11-03 ENCOUNTER — TELEPHONE (OUTPATIENT)
Dept: OBGYN CLINIC | Facility: HOSPITAL | Age: 56
End: 2021-11-03